# Patient Record
Sex: FEMALE | Race: OTHER | NOT HISPANIC OR LATINO
[De-identification: names, ages, dates, MRNs, and addresses within clinical notes are randomized per-mention and may not be internally consistent; named-entity substitution may affect disease eponyms.]

---
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
## 2020-07-30 ENCOUNTER — APPOINTMENT (OUTPATIENT)
Dept: PLASTIC SURGERY | Facility: CLINIC | Age: 20
End: 2020-07-30
Payer: MEDICAID

## 2020-07-30 PROCEDURE — 99213 OFFICE O/P EST LOW 20 MIN: CPT

## 2020-07-30 NOTE — END OF VISIT
[FreeTextEntry3] : I, KAM CHANG, personally scribed for Dr MEHUL PEREYRA  on  7-30-20\par \par All medical record entries made by the scribe were at my direction. I have reviewed the chart and agree that the record accurately reflects my personal performance of the history , physical exam, assessment and plan.\par \par

## 2020-07-30 NOTE — HISTORY OF PRESENT ILLNESS
[FreeTextEntry1] : S/P revision of hypertrophic left arm scar and debulking. DOS: 3-1-19 Patient states she is doing well overall but  she would like to discuss further treatment as there seems to be recurrence of the fibrofatty tissue and bulkiness of the scar causing an additional contour deformity.\par \par Patient with a history of a keloid as a result of a vaccination.  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  The scar was injected several times with KenalogAnd then surgically revised and fat grafting was done on June 24, 2016.  There was recurrence of the deformity and she had additional fat grafting done in April 2017.Flap elevation and debulking was performed in September 2018

## 2020-08-20 ENCOUNTER — APPOINTMENT (OUTPATIENT)
Dept: PLASTIC SURGERY | Facility: CLINIC | Age: 20
End: 2020-08-20
Payer: MEDICAID

## 2020-08-20 PROCEDURE — 14020 TIS TRNFR S/A/L 10 SQ CM/<: CPT

## 2020-08-20 NOTE — PROCEDURE
[FreeTextEntry6] : Preopdx:left arm  scar contracture\par Procedure: scar contracture release and complex closure 1.5cm left arm \par Anesthesia: local 1% w/epi\par Specimens: to path on formalin\par No complications\par \par Summary:\par IC obtained.  Lesion demarcated with marking pen.  1%lido with epinephrine injected.  15 blade used to incise full thickness.   scar incised and released.  Hemostasis obtained with cautery.  Skin edges widely undermined and closed for a complex closure of  1.5cm.  bacitracin and steristrips placed.\par

## 2020-08-20 NOTE — REASON FOR VISIT
[Procedure: _________] : a [unfilled] procedure visit [Parent] : parent [FreeTextEntry1] : Left arm scar revision.

## 2020-08-21 ENCOUNTER — EMERGENCY (EMERGENCY)
Age: 20
LOS: 1 days | Discharge: HOME | End: 2020-08-21
Attending: EMERGENCY MEDICINE | Admitting: EMERGENCY MEDICINE

## 2020-08-21 VITALS
HEART RATE: 73 BPM | SYSTOLIC BLOOD PRESSURE: 110 MMHG | DIASTOLIC BLOOD PRESSURE: 70 MMHG | WEIGHT: 123.9 LBS | TEMPERATURE: 98 F | OXYGEN SATURATION: 100 % | RESPIRATION RATE: 18 BRPM

## 2020-08-21 NOTE — ED PEDIATRIC TRIAGE NOTE - CHIEF COMPLAINT QUOTE
patient reports had scar revision to left upper arm done yesterday at the [facility] office and for the pass 2 hours wound opened and is actively bleeding. PT denies fever, no medical HX VUTD Motrin taken at 1200wound care done, no active bleeding.

## 2020-08-21 NOTE — ED ADULT TRIAGE NOTE - CHIEF COMPLAINT QUOTE
post op bleeding    pt tfr'ed from [facility] ed.... pt had scar revision to left upper arm yesterday... states slight bleeding initially... resolved on its own and then bleeding started again 2 hours .. blood noted on dressing... .  no pain.  denies anticoagulants and trauma

## 2020-08-21 NOTE — ED STATDOCS - OBJECTIVE STATEMENT
21 y/o F with no sig PMX here for post op complication. Pt seen yesterday for scar revision to left upper arm done by Dr. Perkins.  Today pt noticed wound was bleeding and continues to ooze blood.  Pt reports changing about 5 gauze pads. Wound with slow bleed now. VSS.  No fevers, shortness of breath, wheezing, chest pain, cough, abdominal pain, N/V/D, joint tenderness or swelling, conjunctivitis, sore throat, runny nose, congestion. No COVID contacts.   I performed a medical screening examination and determined this patient to be medically stable and will transfer to the [facility] adult ED for further care. heart and lung exam done and both did not reveal concerns for immediate intervention.  REports endorsed to adult ED at spectra [id number]

## 2020-08-22 VITALS
RESPIRATION RATE: 16 BRPM | DIASTOLIC BLOOD PRESSURE: 62 MMHG | SYSTOLIC BLOOD PRESSURE: 108 MMHG | HEART RATE: 65 BPM | OXYGEN SATURATION: 100 %

## 2020-08-22 NOTE — ED PROVIDER NOTE - PATIENT PORTAL LINK FT
You can access the FollowMyHealth Patient Portal offered by [facility] by registering at the following website: [email or web address]. By joining [employer]’s FollowMyHealth portal, you will also be able to view your health information using other applications (apps) compatible with our system.

## 2020-08-22 NOTE — ED PROVIDER NOTE - NS ED ROS FT
GENERAL: No fever or chills  EYES: no change in vision  HEENT: no trouble swallowing or speaking  CARDIAC: no chest pain or palpiations   PULMONARY: no cough or SOB  GI: no abdominal pain, nausea, vomiting, diarrhea, or constipation   : No changes in urination  SKIN: see hpi  NEURO: no headache, numbness, or weakness.  MSK: No joint pain

## 2020-08-22 NOTE — ED ADULT NURSE NOTE - OBJECTIVE STATEMENT
Pt presents to  27 AO4 ambulatory complaining of bleeding following keloid scar removal. Pt had outpatient keloid scar removal performed yesterday to L upper arm, today began bleeding. Wound found to be dehiscence, glued by MD, covered and wrapped. Pt provided with shoulder immobilizer, vitally stable and appears in no obvious distress. will cont to monitor.

## 2020-08-22 NOTE — ED PROVIDER NOTE - OBJECTIVE STATEMENT
21 yo F with no pmh, not on blood thinners, presents with wound bleeding for the last 3 hours  in the setting of a scar revision done  yesterday by Dr. Perkins.  Pt reports changing about 5 paper towels. Wound hemorrhage controlled now. Denies arm tingling or numbness. No lightheadedness, shortness of breath or palpitations. No hx of blood disorders.

## 2020-08-22 NOTE — ED PROVIDER NOTE - ATTENDING CONTRIBUTION TO CARE
HPI: 21 yo F with history of keloids not on blood thinners, presents with wound bleeding for the last 3 hours  in the setting of a scar/keloid revision done on left upper arm yesterday by Dr. Perkins (plastics).  Pt reports changing about 5 paper towels. Wound hemorrhage controlled now. Denies arm tingling or numbness. No lightheadedness, shortness of breath or palpitations, chest pain, weakness. No hx of blood disorders.  EXAM: NAD, speaking full sentences, LUE lateral tricep with sutured scar approx 3 cm linear with 4 sutures but small area of dehiscence with oozing blood, no pus, no other drainage, no surrounding erythema, non tender, LUE neurovascularly intact otherwise.   MDM: pt with keloids that had scar revision yday outpt that presents with left arm wound dehiscence with small volume bleeding, stopped with pressure. No systemic symptoms. Will place dermabond and monitor for rebleeding. no signs or symptoms of infection.

## 2020-08-22 NOTE — ED ADULT NURSE NOTE - CHIEF COMPLAINT QUOTE
post op bleeding    pt tfr'ed from [facility] ed.... pt had scar revision to left upper arm yesterday... states slight bleeding initially... resolved on its own and then bleeding started again 2 hours .. blood noted on dressing... .  no pain.  denies anticoagulants and trauma

## 2020-08-22 NOTE — ED PROVIDER NOTE - PROGRESS NOTE DETAILS
sp dermabond, no longer bleeding after 15 min observation. Placed non adherent dressing, advised to check to ensure healing and f/u with her surgeon. Return precautions explained and understood.

## 2020-08-22 NOTE — ED PROVIDER NOTE - CLINICAL SUMMARY MEDICAL DECISION MAKING FREE TEXT BOX
21 yo F with no pmh, not on blood thinners, presents with wound bleeding for the last 3 hours  in the setting of a scar revision done  yesterday by Dr. Perkins. left upper arm 3cm liner scar incision with sutures in place not longer bleeding, no signs of trauma or infection Distal pulses, sensation and strength intact. Hemorrhage controlled with minor pressure. Will re-shilo and likely dc

## 2020-08-22 NOTE — ED PROVIDER NOTE - NSFOLLOWUPINSTRUCTIONS_ED_ALL_ED_FT
Please follow up with your primary care doctor after you leave the emergency department so that they can follow up and conduct more testing and treatment as they deem necessary. If you have worsening signs or symptoms of what you came in to the Emergency Department today and are not able to see your doctor, go to your nearest emergency department or return to the [facility] emergency department for further care and management.

## 2020-08-22 NOTE — ED PROVIDER NOTE - PHYSICAL EXAMINATION
Gen: AAOx3, non-toxic  Head: NCAT  HEENT: EOMI, oral mucosa moist, normal conjunctiva  Lung: CTAB, no respiratory distress, no wheezes/rhonchi/rales B/L, speaking in full sentences  CV: RRR, no murmurs, rubs or gallops  Abd: soft, NTND, no guarding, no CVA tenderness  MSK: no visible deformities  Neuro: No focal sensory or motor deficits, normal CN exam   Skin: left upper arm 3cm liner scar incision with sutures in place not longer bleeding, no signs of trauma or infection. Distal pulses, sensation and strength intact.   Psych: normal affect.

## 2020-08-24 ENCOUNTER — NON-APPOINTMENT (OUTPATIENT)
Age: 20
End: 2020-08-24

## 2020-08-25 ENCOUNTER — APPOINTMENT (OUTPATIENT)
Dept: OBGYN | Facility: CLINIC | Age: 20
End: 2020-08-25

## 2020-08-26 ENCOUNTER — APPOINTMENT (OUTPATIENT)
Dept: PLASTIC SURGERY | Facility: CLINIC | Age: 20
End: 2020-08-26

## 2020-09-01 ENCOUNTER — APPOINTMENT (OUTPATIENT)
Dept: PLASTIC SURGERY | Facility: CLINIC | Age: 20
End: 2020-09-01
Payer: MEDICAID

## 2020-09-01 PROCEDURE — 99024 POSTOP FOLLOW-UP VISIT: CPT

## 2020-09-03 NOTE — HISTORY OF PRESENT ILLNESS
[FreeTextEntry1] : DOP: 08/20/20 s/p Left arm scar revision. Patient state she is doing well; patient states on 08/21/20 she went to ER for bleeding of the area. Patient states they applied Dermabond to the area.

## 2020-09-15 ENCOUNTER — APPOINTMENT (OUTPATIENT)
Dept: PLASTIC SURGERY | Facility: CLINIC | Age: 20
End: 2020-09-15
Payer: MEDICAID

## 2020-09-15 DIAGNOSIS — L91.0 HYPERTROPHIC SCAR: ICD-10-CM

## 2020-09-15 DIAGNOSIS — M21.922 UNSPECIFIED ACQUIRED DEFORMITY OF LEFT UPPER ARM: ICD-10-CM

## 2020-09-15 PROCEDURE — 99024 POSTOP FOLLOW-UP VISIT: CPT

## 2020-09-16 PROBLEM — L91.0 HYPERTROPHIC SCAR OF SKIN: Status: ACTIVE | Noted: 2020-07-30

## 2020-09-16 NOTE — HISTORY OF PRESENT ILLNESS
[FreeTextEntry1] : DOP: 08/20/20 s/p Left arm scar revision.  Site was bleeding significant amount of weekend after surgery and patient went to emergency department.  Glue placed over stitches.  No further bleeding here for suture removal\par Here for follow up.

## 2020-12-15 ENCOUNTER — APPOINTMENT (OUTPATIENT)
Dept: PLASTIC SURGERY | Facility: CLINIC | Age: 20
End: 2020-12-15

## 2022-02-15 NOTE — ED PROVIDER NOTE - ATTENDING CONTRIBUTION TO CARE
Armida Smith) 17 y.o. female LMP 2 days ago, pt claims was playing soccer last night, pt fell, twisted Lt ankle, c/o ankle pain, diff. ambulating, no numbness to toes, took nothing for pain  PE: in no distress, Lt ankle- lat aspect tenderness to palp., pulses/sensory intact, Unger-neg, limited ROM, Pt s/p twisted ankle, will get x-ray, give Motrin

## 2022-07-20 ENCOUNTER — APPOINTMENT (OUTPATIENT)
Dept: OBGYN | Facility: CLINIC | Age: 22
End: 2022-07-20

## 2022-07-25 ENCOUNTER — RESULT REVIEW (OUTPATIENT)
Age: 22
End: 2022-07-25

## 2023-04-24 NOTE — ED PROVIDER NOTE - GASTROINTESTINAL, MLM
Impression: Amaurosis fugax: G45.3 Right. Plan: --pt denies any new episodes of vision loss
--FA from 04/19/23 showed good macular perfusion, normal A/V transit time
--findings/diagnosis discussed in detail with pt
--CVA precautions discussed Abdomen soft, non-tender, no guarding.

## 2023-10-06 ENCOUNTER — APPOINTMENT (OUTPATIENT)
Dept: OBGYN | Facility: CLINIC | Age: 23
End: 2023-10-06

## 2024-01-08 NOTE — ED ADULT TRIAGE NOTE - HEART RATE (BEATS/MIN)
[address] [facility] [place], [address] [place] [address], P: [phone] [facility] [place], [address] [place] [address], P: [phone]

## 2025-05-28 NOTE — ED PEDIATRIC TRIAGE NOTE - HEART RATE (BEATS/MIN)
73 [CV Risk Factors and Non-Cardiac Disease] : CV risk factors and non-cardiac disease [Follow-Up - Clinic] : a clinic follow-up of [Hyperlipidemia] : hyperlipidemia [Hypertension] : hypertension [Mitral Regurgitation] : mitral regurgitation [FreeTextEntry3] : Dr. Junior  [FreeTextEntry1] : NIDDM